# Patient Record
(demographics unavailable — no encounter records)

---

## 2024-12-07 NOTE — CONSULT LETTER
[Dear  ___] : Dear  [unfilled], [Consult Letter:] : I had the pleasure of evaluating your patient, [unfilled]. [Please see my note below.] : Please see my note below. [Consult Closing:] : Thank you very much for allowing me to participate in the care of this patient.  If you have any questions, please do not hesitate to contact me. [Sincerely,] : Sincerely, [FreeTextEntry3] : César Chacon MD MS [facility]

## 2024-12-07 NOTE — ASSESSMENT
[FreeTextEntry1] : Susie is a 7 year old female with increased stool frequency in the past few weeks without other symptoms of diarrhea.  There may be a behavioral component as she displayed behavioral concerns during the visit today with returning to the toilet throughout the office visit duration.  She may have underlying constipation and incomplete evacuation as well.  Recommended trial of miralax with dose titration to softer stool consistency.  If not improving, should consult with developmental behavioral pediatrics.

## 2024-12-07 NOTE — HISTORY OF PRESENT ILLNESS
[de-identified] : Susie has been having frequent urge to defecate for the past several weeks.  She had a similar episode several months ago, then resolved.  She is having formed stools with frequent attempts to sit on the toilet, sometimes passing small amount of stool and sometimes no stool.  She is not having abdominal pain, blood in the stool, liquid stools, nausea, vomiting, weight loss, other concerns.  Her mother is concerned there is a behavioral component to this problem.  She seems to respond to each somatic stimulus as an urge to defecate.  And despite her mother's suggestions to come off the toilet, she won't until she passes stool.  She has been having more flatus lately as well.

## 2024-12-07 NOTE — HISTORY OF PRESENT ILLNESS
[de-identified] : Susie has been having frequent urge to defecate for the past several weeks.  She had a similar episode several months ago, then resolved.  She is having formed stools with frequent attempts to sit on the toilet, sometimes passing small amount of stool and sometimes no stool.  She is not having abdominal pain, blood in the stool, liquid stools, nausea, vomiting, weight loss, other concerns.  Her mother is concerned there is a behavioral component to this problem.  She seems to respond to each somatic stimulus as an urge to defecate.  And despite her mother's suggestions to come off the toilet, she won't until she passes stool.  She has been having more flatus lately as well.

## 2024-12-07 NOTE — CONSULT LETTER
[Dear  ___] : Dear  [unfilled], [Consult Letter:] : I had the pleasure of evaluating your patient, [unfilled]. [Please see my note below.] : Please see my note below. [Consult Closing:] : Thank you very much for allowing me to participate in the care of this patient.  If you have any questions, please do not hesitate to contact me. [Sincerely,] : Sincerely, [FreeTextEntry3] : César Chacon MD MS [facility]

## 2024-12-07 NOTE — ASSESSMENT
24-72 Hour HSP F/U-ACUTE CHF   [FreeTextEntry1] : Susie is a 7 year old female with increased stool frequency in the past few weeks without other symptoms of diarrhea.  There may be a behavioral component as she displayed behavioral concerns during the visit today with returning to the toilet throughout the office visit duration.  She may have underlying constipation and incomplete evacuation as well.  Recommended trial of miralax with dose titration to softer stool consistency.  If not improving, should consult with developmental behavioral pediatrics.